# Patient Record
Sex: MALE | Race: WHITE | NOT HISPANIC OR LATINO
[De-identification: names, ages, dates, MRNs, and addresses within clinical notes are randomized per-mention and may not be internally consistent; named-entity substitution may affect disease eponyms.]

---

## 2017-11-13 ENCOUNTER — HOSPITAL (OUTPATIENT)
Dept: OTHER | Age: 37
End: 2017-11-13

## 2017-11-13 ENCOUNTER — IMAGING SERVICES (OUTPATIENT)
Dept: OTHER | Age: 37
End: 2017-11-13

## 2021-10-27 ENCOUNTER — TELEPHONE (OUTPATIENT)
Dept: INTERNAL MEDICINE | Age: 41
End: 2021-10-27

## 2021-10-27 RX ORDER — AZITHROMYCIN 250 MG/1
TABLET, FILM COATED ORAL
Qty: 6 TABLET | Refills: 0 | Status: SHIPPED | OUTPATIENT
Start: 2021-10-27

## 2021-10-27 RX ORDER — PREDNISONE 50 MG/1
50 TABLET ORAL DAILY
Qty: 5 TABLET | Refills: 0 | Status: SHIPPED | OUTPATIENT
Start: 2021-10-27

## 2021-10-29 ENCOUNTER — HOSPITAL ENCOUNTER (INPATIENT)
Facility: HOSPITAL | Age: 41
LOS: 3 days | Discharge: HOME | DRG: 177 | End: 2021-11-02
Attending: EMERGENCY MEDICINE | Admitting: HOSPITALIST
Payer: COMMERCIAL

## 2021-10-29 ENCOUNTER — APPOINTMENT (OUTPATIENT)
Dept: GENERAL RADIOLOGY | Facility: HOSPITAL | Age: 41
DRG: 177 | End: 2021-10-29
Attending: EMERGENCY MEDICINE
Payer: COMMERCIAL

## 2021-10-29 DIAGNOSIS — R74.01 TRANSAMINITIS: ICD-10-CM

## 2021-10-29 DIAGNOSIS — J96.01 ACUTE HYPOXEMIC RESPIRATORY FAILURE DUE TO COVID-19 (HCC): Primary | ICD-10-CM

## 2021-10-29 DIAGNOSIS — U07.1 ACUTE HYPOXEMIC RESPIRATORY FAILURE DUE TO COVID-19 (HCC): Primary | ICD-10-CM

## 2021-10-29 DIAGNOSIS — J12.82 PNEUMONIA DUE TO COVID-19 VIRUS: ICD-10-CM

## 2021-10-29 DIAGNOSIS — U07.1 PNEUMONIA DUE TO COVID-19 VIRUS: ICD-10-CM

## 2021-10-29 PROCEDURE — 71045 X-RAY EXAM CHEST 1 VIEW: CPT | Performed by: EMERGENCY MEDICINE

## 2021-10-29 RX ORDER — ACETAMINOPHEN AND CODEINE PHOSPHATE 300; 30 MG/1; MG/1
1 TABLET ORAL ONCE
Status: COMPLETED | OUTPATIENT
Start: 2021-10-29 | End: 2021-10-29

## 2021-10-30 ENCOUNTER — APPOINTMENT (OUTPATIENT)
Dept: CT IMAGING | Facility: HOSPITAL | Age: 41
DRG: 177 | End: 2021-10-30
Attending: EMERGENCY MEDICINE
Payer: COMMERCIAL

## 2021-10-30 PROBLEM — U07.1 PNEUMONIA DUE TO COVID-19 VIRUS: Status: ACTIVE | Noted: 2021-10-30

## 2021-10-30 PROBLEM — R74.01 TRANSAMINITIS: Status: ACTIVE | Noted: 2021-10-30

## 2021-10-30 PROBLEM — J12.82 PNEUMONIA DUE TO COVID-19 VIRUS: Status: ACTIVE | Noted: 2021-10-30

## 2021-10-30 PROCEDURE — 71260 CT THORAX DX C+: CPT | Performed by: EMERGENCY MEDICINE

## 2021-10-30 PROCEDURE — 99223 1ST HOSP IP/OBS HIGH 75: CPT | Performed by: HOSPITALIST

## 2021-10-30 PROCEDURE — 3E0333Z INTRODUCTION OF ANTI-INFLAMMATORY INTO PERIPHERAL VEIN, PERCUTANEOUS APPROACH: ICD-10-PCS | Performed by: EMERGENCY MEDICINE

## 2021-10-30 PROCEDURE — XW033E5 INTRODUCTION OF REMDESIVIR ANTI-INFECTIVE INTO PERIPHERAL VEIN, PERCUTANEOUS APPROACH, NEW TECHNOLOGY GROUP 5: ICD-10-PCS | Performed by: HOSPITALIST

## 2021-10-30 RX ORDER — DEXAMETHASONE 6 MG/1
6 TABLET ORAL DAILY
Status: DISCONTINUED | OUTPATIENT
Start: 2021-10-30 | End: 2021-11-02

## 2021-10-30 RX ORDER — ALBUTEROL SULFATE 90 UG/1
4 AEROSOL, METERED RESPIRATORY (INHALATION) EVERY 4 HOURS PRN
Status: DISCONTINUED | OUTPATIENT
Start: 2021-10-30 | End: 2021-11-02

## 2021-10-30 RX ORDER — MELATONIN
3 NIGHTLY
Status: DISCONTINUED | OUTPATIENT
Start: 2021-10-30 | End: 2021-11-02

## 2021-10-30 RX ORDER — DEXAMETHASONE SODIUM PHOSPHATE 10 MG/ML
6 INJECTION, SOLUTION INTRAMUSCULAR; INTRAVENOUS ONCE
Status: COMPLETED | OUTPATIENT
Start: 2021-10-30 | End: 2021-10-30

## 2021-10-30 RX ORDER — ZINC SULFATE 50(220)MG
220 CAPSULE ORAL 2 TIMES DAILY
Status: DISCONTINUED | OUTPATIENT
Start: 2021-10-30 | End: 2021-11-02

## 2021-10-30 RX ORDER — ENOXAPARIN SODIUM 100 MG/ML
40 INJECTION SUBCUTANEOUS DAILY
Status: DISCONTINUED | OUTPATIENT
Start: 2021-10-30 | End: 2021-11-02

## 2021-10-30 RX ORDER — FAMOTIDINE 20 MG/1
20 TABLET ORAL 2 TIMES DAILY
Status: DISCONTINUED | OUTPATIENT
Start: 2021-10-30 | End: 2021-11-02

## 2021-10-30 RX ORDER — ACETAMINOPHEN 325 MG/1
650 TABLET ORAL EVERY 6 HOURS PRN
Status: DISCONTINUED | OUTPATIENT
Start: 2021-10-30 | End: 2021-11-02

## 2021-10-30 RX ORDER — ASCORBIC ACID 500 MG
1000 TABLET ORAL DAILY
Status: DISCONTINUED | OUTPATIENT
Start: 2021-10-30 | End: 2021-11-02

## 2021-10-30 RX ORDER — GUAIFENESIN 600 MG
600 TABLET, EXTENDED RELEASE 12 HR ORAL 2 TIMES DAILY
Status: DISCONTINUED | OUTPATIENT
Start: 2021-10-30 | End: 2021-11-02

## 2021-10-30 RX ORDER — MELATONIN
2000 DAILY
Status: DISCONTINUED | OUTPATIENT
Start: 2021-10-30 | End: 2021-11-02

## 2021-10-30 RX ORDER — DEXAMETHASONE SODIUM PHOSPHATE 4 MG/ML
6 VIAL (ML) INJECTION DAILY
Status: DISCONTINUED | OUTPATIENT
Start: 2021-10-30 | End: 2021-11-02

## 2021-10-30 NOTE — H&P
[facility]    PATIENT'S NAME: Brigette Elias   ATTENDING PHYSICIAN: Lucinda Hernández MD   PATIENT ACCOUNT#:   [id number]    LOCATION:  [address] [place] [address] RECORD #:   [id number]       YOB: 1980  ADMISSION DATE:       10/29/2021 cannula. HEENT:  Extraocular movements are intact. Pupils equal, round, reactive to light and accommodation. Atraumatic, normocephalic. LUNGS:  Patient does have bibasilar rhonchi. HEART:  S1, S2 appreciated. ABDOMEN:  Soft, nontender, nondistended. cough, we will continue Mucinex. 3.   VTE prophylaxis at this time will be Lovenox subcutaneously daily. DISPOSITION:  At this time, we will continue to monitor him closely. Patient is a Full Code. Further recommendations to follow.     Greater than 7

## 2021-10-30 NOTE — PLAN OF CARE
Problem: Patient Centered Care  Goal: Patient preferences are identified and integrated in the patient's plan of care  Description: Interventions:  - What would you like us to know as we care for you? From home with spouse who also has covid.   - Provide oxygen saturation or ABGs  - Provide Smoking Cessation handout, if applicable  - Encourage broncho-pulmonary hygiene including cough, deep breathe, Incentive Spirometry  - Assess the need for suctioning and perform as needed  - Assess and instruct to repor pain goal  Description: INTERVENTIONS:  - Encourage pt to monitor pain and request assistance  - Assess pain using appropriate pain scale  - Administer analgesics based on type and severity of pain and evaluate response  - Implement non-pharmacological mukul interpreters to assist at discharge as needed  - Consider post-discharge preferences of patient/family/discharge partner  - Complete POLST form as appropriate  - Assess patient's ability to be responsible for managing their own health  - Refer to [facility]

## 2021-10-30 NOTE — PLAN OF CARE
Problem: Patient Centered Care  Goal: Patient preferences are identified and integrated in the patient's plan of care  Description: Interventions:  - What would you like us to know as we care for you? From home with wife and 4 kids.   Wife is as well posi signs of decreased cardiac output  - Evaluate effectiveness of vasoactive medications to optimize hemodynamic stability  - Monitor arterial and/or venous puncture sites for bleeding and/or hematoma  - Assess quality of pulses, skin color and temperature  - mucous membranes remain intact  Description: INTERVENTIONS  - Assess oral mucosa and hygiene practices  - Implement preventative oral hygiene regimen  - Implement oral medicated treatments as ordered  Outcome: Progressing     Problem: HEMATOLOGIC - ADULT including physical limitations  - Instruct pt to call for assistance with activity based on assessment  - Modify environment to reduce risk of injury  - Provide assistive devices as appropriate  - Consider OT/PT consult to assist with strengthening/mobilit

## 2021-10-30 NOTE — ED PROVIDER NOTES
Patient Seen in: [facility] AND [facility] Emergency Department    History   Patient presents with:  Covid    Stated Complaint: Covid symptoms     HPI    52-year-old male without past medical history presenting for evaluation of COVID symptoms over past eight days Temp 98.5 °F (36.9 °C)   Temp src Oral   SpO2 93 %   O2 Device None (Room air)       Current:/83   Pulse 85   Temp 98.5 °F (36.9 °C) (Oral)   Resp 18   Ht 182.9 cm (6')   Wt 102.1 kg   SpO2 93%   BMI 30.52 kg/m²         Physical Exam   Constitution Abnormality         Status                     ---------                               -----------         ------                     CBC W/ DIFFERENTIAL[[id number]]          Abnormal            Final result                 Please view results for these EXAM: 10/29/2021  Patient No:  [id number]  Physician:  Michelle Carlson  YOB: 1980    Past Medical History (entered by Technologist):    Reason For Exam (entered by Technologist):  Shortness of breath, fever, dry cough and chest pain x Interpretation: Pulse Readings from Last 1 Encounters:  10/29/21 : 85  , sinus,      Evaluation for Covid symptoms over past 8 days with positive swab 3 days ago and now with worsening pleuritic chest pain/shortness of breath and hemoptysis.   Patient noted

## 2021-10-30 NOTE — ED INITIAL ASSESSMENT (HPI)
Patient here with Covid symptoms for the past 9 days. Patient took at home Covid test on 10/26 which came back positive. Patient here today with worsening shortness of breath, chest pain and fevers. Patient took ibuprofen about 3 hours ago.

## 2021-10-30 NOTE — CONSULTS
[facility] ID CONSULT NOTE    Theron Gomes Patient Status:  Inpatient    10/31/1980 MRN [id number]   Location [facility] Attending Cleopatra Machuca MD   Hosp Day # 0 PCP Mayelin Cintron DO       Reason for Consult sulfate (ZINCATE) cap 220 mg, 220 mg, Oral, BID  •  Vitamin D3 (Cholecalciferol) (VITAMIN D3) tab 2,000 Units, 2,000 Units, Oral, Daily  •  ascorbic acid (VITAMIN C) tab 1,000 mg, 1,000 mg, Oral, Daily  •  melatonin tab 3 mg, 3 mg, Oral, Nightly  •  famoti on 10/21 with a sore throat and progressed to worsening cough. Took a home COVID-19 test on 10/26 that was positive. Just finished a zpak yesterday and has been taking PO prednisone the last three days. Family at home also positive for COVID-19.  Had worsen

## 2021-10-30 NOTE — ED QUICK NOTES
Orders for admission, patient is aware of plan and ready to go upstairs. Any questions, please call ED CEDRIC Poole  at extension [phone].    Type of COVID test sent: Rapid  COVID Suspicion level: High    Titratable drug(s) infusing: none  Rate:    LOC at time of

## 2021-10-31 PROCEDURE — 99233 SBSQ HOSP IP/OBS HIGH 50: CPT | Performed by: HOSPITALIST

## 2021-10-31 NOTE — PROGRESS NOTES
[place] [facility]    Progress Note    Eulah Night Patient Status:  Inpatient    10/31/1980 MRN [id number]   Location [address] [facility] Attending Reyes Sebastian MD   Hosp Day # 1 PCP Nunu Rosas DO     Subjective:   Subjective:  Drew Bradela report was issued by the [address] [place] [address] Radiology teleradiology service. There are no major discrepancies.   Dictated by (CST): Octavio Little MD on 10/30/2021 at 7:32 AM     Finalized by (CST): Octavio Little MD on 10/30/2021 at 7:33 AM          CT CHEST P

## 2021-11-01 PROCEDURE — 99233 SBSQ HOSP IP/OBS HIGH 50: CPT | Performed by: HOSPITALIST

## 2021-11-01 NOTE — PROGRESS NOTES
[facility]    Progress Note    Leandro Dixon Patient Status:  Inpatient    10/31/1980 MRN [id number]   Location [address] [facility] Attending Maite Valencia MD   Hosp Day # 2 PCP Migel Moore DO     Subjective:   Subjective:  Yara Farley pneumonia  -Symptom onset 10/20  -Diagnosed 10/26  -on Decadron 10/30  -RDV 10/30  -Not Vaccinated  -Currently on 1 L nasal cannula  -You on board appreciate commendations  -We will continue to monitor closely  -inflammatory markers improving, ddimer wnl.

## 2021-11-01 NOTE — PLAN OF CARE
Patient is A&O x4. Vital signs stable on room air. Bowel movement X1. Voiding freely in the bathroom and passing gas. No complaints overshift. Call light within reach. Plan to discharge home within 1-2 days.      Problem: CARDIOVASCULAR - ADULT  Goal: Maint promote bladder emptying  Outcome: Progressing     Problem: SKIN/TISSUE INTEGRITY - ADULT  Goal: Skin integrity remains intact  Description: INTERVENTIONS  - Assess and document risk factors for pressure ulcer development  - Assess and document skin integr period  Description: INTERVENTIONS  - Monitor WBC  - Administer growth factors as ordered  - Implement neutropenic guidelines  Outcome: Progressing     Problem: SAFETY ADULT - FALL  Goal: Free from fall injury  Description: INTERVENTIONS:  - Assess pt freq

## 2021-11-01 NOTE — PAYOR COMM NOTE
--------------  CONTINUED STAY REVIEW    Payor: [employer] [facility]  Subscriber #:  [id number]  Authorization Number: [id number]    Admit date: 10/30/21  Admit time:  3:42 AM    Admitting Physician: Ana Don MD  Attending Physician:  Herlinda Wilson, 11/01/21 0922 97.3 °F (36.3 °C) 58 18 123/74 94 % — Nasal cannula — WZ    11/01/21 0500 98.7 °F (37.1 °C) — 16 135/71 97 % — Nasal cannula 1 L/min AS    10/31/21 2155 97.6 °F (36.4 °C) — 18 119/83 97 % — Nasal cannula 2 L/min AS    10/31/21 1718 97.3 °F 13.6 11/01/2021     HCT 41.7 11/01/2021     .0 11/01/2021     CREATSERUM 0.83 11/01/2021     BUN 17 11/01/2021      11/01/2021     K 4.5 11/01/2021      11/01/2021     CO2 32.0 11/01/2021      (H) 11/01/2021     CA 8.8 11/01/2021

## 2021-11-01 NOTE — PAYOR COMM NOTE
--------------  ADMISSION REVIEW     Payor: [employer]  Subscriber #:  [id number]  Authorization Number: [id number]    Admit date: 10/30/21  Admit time:  3:42 AM       REVIEW DOCUMENTATION:     ED Provider Notes      ED Provider Notes signed by Albina Hickman for dysuria and hematuria. Neurological: Negative for syncope and headaches. Positive for stated complaint: Covid symptoms  Other systems are as noted in HPI. Constitutional and vital signs reviewed.       All other systems reviewed and negative excep within normal limits   CBC W/ DIFFERENTIAL - Abnormal; Notable for the following components:    Lymphocyte Absolute 0.47 (*)     All other components within normal limits   CK CREATINE KINASE (NOT CREATININE) - Normal   TROPONIN I - Normal   PROCALCITONIN you cannot reach me at this number, do not leave a voicemail. Please call [phone] (ext 1) and ask for the next available radiologist.      Salvador Marinelli M.D.   This report has been electronically signed and verified by the Radiologist whose name is error, please notify the sender immediately at [phone] and permanently delete the original report and destroy any copies or printouts.              MDM     DIFFERENTIAL DIAGNOSIS: After history and physical exam differential diagnosis includes but is n [facility]    PATIENT'S NAME: Zach Charbel   ATTENDING PHYSICIAN: Clau Proctor MD   PATIENT ACCOUNT#:   [id number]    LOCATION:  [address] [place] [address] RECORD #:   [id number]       YOB: 1980  ADMISSION DATE:       10/29/20 Extraocular movements are intact. Pupils equal, round, reactive to light and accommodation. Atraumatic, normocephalic. LUNGS:  Patient does have bibasilar rhonchi. HEART:  S1, S2 appreciated. ABDOMEN:  Soft, nontender, nondistended.   Positive bowel so continue Mucinex. 3.   VTE prophylaxis at this time will be Lovenox subcutaneously daily. DISPOSITION:  At this time, we will continue to monitor him closely. Patient is a Full Code. Further recommendations to follow.     Greater than 70 minutes were Vitals (last day)     Date/Time Temp Pulse Resp BP SpO2 Weight O2 Device O2 Flow Rate (L/min) Who    11/01/21 0922 97.3 °F (36.3 °C) 58 18 123/74 94 % — Nasal cannula — WZ    11/01/21 0500 98.7 °F (37.1 °C) — 16 135/71 97 % — Nasal cannula 1 L/min AS Case d/w patient, RN.     Thank you for allowing us to participate in the care of this patient. Please do not hesitate to call if you have any questions.    We will continue to follow with you and will make further recommendations based on his progress.

## 2021-11-01 NOTE — PLAN OF CARE
Patient alert and oriented x4, on 1L NC. No complaints throughout the night, educated patient to call for assistance. Call light within reach.     Problem: Patient Centered Care  Goal: Patient preferences are identified and integrated in the patient's plan output and hemodynamic stability  Description: INTERVENTIONS:  - Monitor vital signs, rhythm, and trends  - Monitor for bleeding, hypotension and signs of decreased cardiac output  - Evaluate effectiveness of vasoactive medications to optimize hemodynamic areas of redness and/or skin breakdown  - Initiate interventions, skin care algorithm/standards of care as needed  Outcome: Progressing  Goal: Oral mucous membranes remain intact  Description: INTERVENTIONS  - Assess oral mucosa and hygiene practices  - Im deficits and behaviors that affect risk of falls.   - [place] fall precautions as indicated by assessment.  - Educate pt/family on patient safety including physical limitations  - Instruct pt to call for assistance with activity based on assessment  - Mod

## 2021-11-02 VITALS
HEART RATE: 75 BPM | WEIGHT: 224.13 LBS | TEMPERATURE: 98 F | DIASTOLIC BLOOD PRESSURE: 62 MMHG | SYSTOLIC BLOOD PRESSURE: 111 MMHG | BODY MASS INDEX: 30.36 KG/M2 | RESPIRATION RATE: 18 BRPM | HEIGHT: 72 IN | OXYGEN SATURATION: 96 %

## 2021-11-02 PROCEDURE — 99239 HOSP IP/OBS DSCHRG MGMT >30: CPT | Performed by: HOSPITALIST

## 2021-11-02 RX ORDER — ALBUTEROL SULFATE 90 UG/1
4 AEROSOL, METERED RESPIRATORY (INHALATION) EVERY 4 HOURS PRN
Qty: 1 EACH | Refills: 0 | Status: SHIPPED | OUTPATIENT
Start: 2021-11-02

## 2021-11-02 RX ORDER — ZINC SULFATE 50(220)MG
220 CAPSULE ORAL 2 TIMES DAILY
Qty: 30 CAPSULE | Refills: 0 | Status: SHIPPED | OUTPATIENT
Start: 2021-11-02

## 2021-11-02 RX ORDER — DEXAMETHASONE 6 MG/1
6 TABLET ORAL DAILY
Qty: 5 TABLET | Refills: 0 | Status: SHIPPED | OUTPATIENT
Start: 2021-11-03 | End: 2021-11-08

## 2021-11-02 RX ORDER — GUAIFENESIN 600 MG
600 TABLET, EXTENDED RELEASE 12 HR ORAL 2 TIMES DAILY
Qty: 28 TABLET | Refills: 0 | Status: SHIPPED | OUTPATIENT
Start: 2021-11-02 | End: 2021-11-16

## 2021-11-02 RX ORDER — ACETAMINOPHEN 325 MG/1
650 TABLET ORAL EVERY 6 HOURS PRN
Qty: 20 TABLET | Refills: 0 | Status: SHIPPED | OUTPATIENT
Start: 2021-11-02

## 2021-11-02 RX ORDER — CHOLECALCIFEROL (VITAMIN D3) 25 MCG
2000 TABLET ORAL DAILY
Qty: 60 TABLET | Refills: 0 | Status: SHIPPED | OUTPATIENT
Start: 2021-11-03

## 2021-11-02 NOTE — PLAN OF CARE
Patient is clear for discharge per MD. Patient feels better. Ambulating on room air. All discharge paper and instructions reviewed with patient. IV removed. Being picked up by wife.     Problem: Patient Centered Care  Goal: Patient preferences are identifie ADULT  Goal: Maintains optimal cardiac output and hemodynamic stability  Description: INTERVENTIONS:  - Monitor vital signs, rhythm, and trends  - Monitor for bleeding, hypotension and signs of decreased cardiac output  - Evaluate effectiveness of vasoacti skin integrity  - Monitor for areas of redness and/or skin breakdown  - Initiate interventions, skin care algorithm/standards of care as needed  Outcome: Completed  Goal: Oral mucous membranes remain intact  Description: INTERVENTIONS  - Assess oral mucosa and physical deficits and behaviors that affect risk of falls.   - [place] fall precautions as indicated by assessment.  - Educate pt/family on patient safety including physical limitations  - Instruct pt to call for assistance with activity based on asse

## 2021-11-02 NOTE — DISCHARGE SUMMARY
Hospital Discharge Diagnoses: Acute Hypoxemic respiratory failure due to covid-19    Lace+ Score: 26  59-90 High Risk  29-58 Medium Risk  0-28   Low Risk.     TCM Follow-Up Recommendation:  LACE < 29: Low Risk of readmission after discharge from the [facility]

## 2021-11-02 NOTE — PROGRESS NOTES
[facility]    Progress Note    Frances Marin Patient Status:  Inpatient    10/31/1980 MRN [id number]   Location [address] [facility] Attending Yg Berkowitz MD   Hosp Day # 3 PCP Norma Romero DO     Subjective:   Subjective:  Shireen Escobar pneumonia  -Symptom onset 10/20  -Diagnosed 10/26  -on Decadron 10/30  -RDV 10/30  -Not Vaccinated  -Currently on 1 L nasal cannula  -You on board appreciate commendations  -We will continue to monitor closely  -inflammatory markers improving, ddimer wnl.

## 2021-11-02 NOTE — PLAN OF CARE
Patient alert and oriented, on RA. Independent. RDV time changed from 0200a to 0800a per patients request. No complaints throughout the night. Educated patient to call for assistance, call light within reach.   Problem: Patient Centered Care  Goal: Patient Problem: CARDIOVASCULAR - ADULT  Goal: Maintains optimal cardiac output and hemodynamic stability  Description: INTERVENTIONS:  - Monitor vital signs, rhythm, and trends  - Monitor for bleeding, hypotension and signs of decreased cardiac output  - Evalua development  - Assess and document skin integrity  - Monitor for areas of redness and/or skin breakdown  - Initiate interventions, skin care algorithm/standards of care as needed  Outcome: Progressing  Goal: Oral mucous membranes remain intact  Description frequently for physical needs  - Identify cognitive and physical deficits and behaviors that affect risk of falls.   - [place] fall precautions as indicated by assessment.  - Educate pt/family on patient safety including physical limitations  - Instruct p

## 2021-11-02 NOTE — SPIRITUAL CARE NOTE
initiated tele-conference pastoral visit. Pt responded to call. Pt sounded upbeat, breathing not labored. Pt is 35year old male, admitted 10/29/2021.  Pt chart shows:  COVID 19: Positive 10/29/2021    Patient here with Covid symptoms for the p

## 2021-11-02 NOTE — DIETARY NOTE
Nutrition Brief Note    Patient screened at no nutritional risk at admission by RN. Chart Review completed by RD due to COVID19+ and suspected poor PO intake. Intake 100%. No oral nutrition supplements (ONS) initiated at this time.   Will follow and con

## 2021-11-02 NOTE — PAYOR COMM NOTE
--------------  CONTINUED STAY REVIEW    Payor: [employer]  Subscriber #:  [id number]  Authorization Number: [id number]    Admit date: 10/30/21  Admit time:  3:42 AM    Admitting Physician: Ewa Paula MD  Attending Physician:  Karen Mathis, 125 10/29/2021         No results found.               Assessment & Plan:          Acute respiratory failure with hypoxia  -Secondary to Covid pneumonia  -Symptom onset 10/20  -Diagnosed 10/26  -on Decadron 10/30  -RDV 10/30  -Not Vaccinated  -Currently on Given 2,000 Units Oral Donald Welch RN      zinc sulfate (ZINCATE) cap 220 mg     Date Action Dose Route User    11/2/2021 0931 Given 220 mg Oral Donald Welch RN    11/1/2021 2014 Given 220 mg Oral Tia Saba RN          Vitals (last day)     D

## 2021-11-03 NOTE — PAYOR COMM NOTE
--------------  DISCHARGE REVIEW    Payor: [employer]  Subscriber #:  [id number]  Authorization Number: [id number]    Admit date: 10/30/21  Admit time:   3:42 AM  Discharge Date: 11/2/2021  4:10 PM     Admitting Physician: Gregory Maldonado MD  Attendi

## 2021-11-03 NOTE — PROGRESS NOTES
INFECTIOUS DISEASE PROGRESS NOTE    Leandro Dixon Patient Status:  Inpatient    10/31/1980 MRN [id number]   Location [address] [place] [address] Attending No att. providers found   Hosp Day # 3 PCP Migel Moore, DO     SUBJECTIVE  ROS done. On room air.  Feels RN, Primary      Nery Cooper MD  [facility]  [phone]

## 2021-11-04 NOTE — DISCHARGE SUMMARY
[facility]    PATIENT'S NAME: Cece Gage   ATTENDING PHYSICIAN: Rainer Phillips MD   PATIENT ACCOUNT#:   [id number]    LOCATION:  [address] [place] [address] RECORD #:   [id number]       YOB: 1980  ADMISSION DATE:       10/29/2021 sounds. EXTREMITIES:  Peripheral pulses are positive. NEUROLOGIC:  No focal neurologic deficits noted at this time. LABORATORY DATA:  Reviewed. CONDITION ON DISCHARGE:  At this time, the patient is deemed stable to be discharged home.     Dictated B